# Patient Record
Sex: MALE | Employment: FULL TIME | URBAN - METROPOLITAN AREA
[De-identification: names, ages, dates, MRNs, and addresses within clinical notes are randomized per-mention and may not be internally consistent; named-entity substitution may affect disease eponyms.]

---

## 2024-10-25 ENCOUNTER — OFFICE VISIT (OUTPATIENT)
Dept: URGENT CARE | Age: 24
End: 2024-10-25
Payer: COMMERCIAL

## 2024-10-25 VITALS
RESPIRATION RATE: 20 BRPM | DIASTOLIC BLOOD PRESSURE: 55 MMHG | OXYGEN SATURATION: 98 % | HEART RATE: 60 BPM | TEMPERATURE: 98.8 F | SYSTOLIC BLOOD PRESSURE: 120 MMHG

## 2024-10-25 DIAGNOSIS — S61.210A LACERATION OF RIGHT INDEX FINGER W/O FOREIGN BODY W/O DAMAGE TO NAIL, INITIAL ENCOUNTER: Primary | ICD-10-CM

## 2024-10-25 RX ORDER — CEPHALEXIN 500 MG/1
500 CAPSULE ORAL 3 TIMES DAILY
Qty: 15 CAPSULE | Refills: 0 | Status: SHIPPED | OUTPATIENT
Start: 2024-10-25 | End: 2024-10-30

## 2024-10-25 ASSESSMENT — ENCOUNTER SYMPTOMS: WOUND: 1

## 2024-10-25 NOTE — PROGRESS NOTES
Subjective   Patient ID: Cholo Davis is a 24 y.o. male. They present today with a chief complaint of Finger Laceration (45 min ago - rt pointer finger).    History of Present Illness  HPI    Patient presents to urgent care for complaints of right index finger.  Patient states he cut his finger on a window scraper proximately 40 minutes ago.  He is unsure when his last tetanus was.  Past Medical History  Allergies as of 10/25/2024    (No Known Allergies)       (Not in a hospital admission)       No past medical history on file.    No past surgical history on file.         Review of Systems  Review of Systems   Skin:  Positive for wound.                                  Objective    Vitals:    10/25/24 1601   BP: 120/55   Pulse: 60   Resp: 20   Temp: 37.1 °C (98.8 °F)   TempSrc: Temporal   SpO2: 98%     No LMP for male patient.    Physical Exam  Constitutional:       Appearance: Normal appearance.   HENT:      Head: Normocephalic.   Skin:     General: Skin is warm and dry.      Findings: Laceration (Approximately 0.5 cm laceration to right index finger on DIP joints on palmar side of hand) present.   Neurological:      Mental Status: He is alert.         Laceration Repair    Date/Time: 10/25/2024 4:31 PM    Performed by: ETHAN Harrell  Authorized by: ETHAN Harrell    Consent:     Consent obtained:  Verbal    Consent given by:  Patient    Risks discussed:  Infection and poor wound healing  Anesthesia:     Anesthesia method:  None  Laceration details:     Location:  Finger    Finger location:  R index finger    Length (cm):  0.5  Exploration:     Wound extent: no tendon damage noted    Treatment:     Area cleansed with:  Chlorhexidine    Amount of cleaning:  Standard    Irrigation solution:  Sterile saline    Layers repaired: Superficial.  Skin repair:     Repair method:  Tissue adhesive  Approximation:     Approximation:  Close  Repair type:     Repair type:  Simple  Post-procedure  details:     Dressing:  Non-adherent dressing    Procedure completion:  Tolerated      Point of Care Test & Imaging Results from this visit  No results found for this visit on 10/25/24.   No results found.    Diagnostic study results (if any) were reviewed by ETHAN Harrell.    Assessment/Plan   Allergies, medications, history, and pertinent labs/EKGs/Imaging reviewed by ETHAN Harrell.     Medical Decision Making  Wound was repaired with Dermabond.  Patient's tetanus was updated in office.  Will start patient on Keflex to prevent secondary infection.  Discussed home care instructions regarding wound care and when to follow-up.  Patient verbalizes understanding and comfortable plan of care.    Orders and Diagnoses  Diagnoses and all orders for this visit:  Laceration of right index finger w/o foreign body w/o damage to nail, initial encounter  -     cephalexin (Keflex) 500 mg capsule; Take 1 capsule (500 mg) by mouth 3 times a day for 5 days.  Other orders  -     Tdap vaccine, age 7 years and older  (BOOSTRIX)      Medical Admin Record      Patient disposition: Home    Electronically signed by ETHAN Harrell  4:16 PM